# Patient Record
Sex: FEMALE | Race: WHITE
[De-identification: names, ages, dates, MRNs, and addresses within clinical notes are randomized per-mention and may not be internally consistent; named-entity substitution may affect disease eponyms.]

---

## 2019-06-16 ENCOUNTER — HOSPITAL ENCOUNTER (INPATIENT)
Dept: HOSPITAL 35 - ER | Age: 74
LOS: 1 days | Discharge: HOME | DRG: 189 | End: 2019-06-17
Attending: HOSPITALIST | Admitting: HOSPITALIST
Payer: COMMERCIAL

## 2019-06-16 VITALS — BODY MASS INDEX: 22.65 KG/M2 | HEIGHT: 57 IN | WEIGHT: 105 LBS

## 2019-06-16 VITALS — DIASTOLIC BLOOD PRESSURE: 83 MMHG | SYSTOLIC BLOOD PRESSURE: 193 MMHG

## 2019-06-16 DIAGNOSIS — I13.0: ICD-10-CM

## 2019-06-16 DIAGNOSIS — Z95.2: ICD-10-CM

## 2019-06-16 DIAGNOSIS — I16.0: ICD-10-CM

## 2019-06-16 DIAGNOSIS — Z82.49: ICD-10-CM

## 2019-06-16 DIAGNOSIS — Z95.0: ICD-10-CM

## 2019-06-16 DIAGNOSIS — I10: ICD-10-CM

## 2019-06-16 DIAGNOSIS — Z88.8: ICD-10-CM

## 2019-06-16 DIAGNOSIS — Z79.899: ICD-10-CM

## 2019-06-16 DIAGNOSIS — I50.9: ICD-10-CM

## 2019-06-16 DIAGNOSIS — J44.9: ICD-10-CM

## 2019-06-16 DIAGNOSIS — I49.5: ICD-10-CM

## 2019-06-16 DIAGNOSIS — J96.21: Primary | ICD-10-CM

## 2019-06-16 DIAGNOSIS — N17.9: ICD-10-CM

## 2019-06-16 DIAGNOSIS — Z88.0: ICD-10-CM

## 2019-06-16 DIAGNOSIS — Z99.81: ICD-10-CM

## 2019-06-16 DIAGNOSIS — Z79.82: ICD-10-CM

## 2019-06-16 DIAGNOSIS — N18.9: ICD-10-CM

## 2019-06-16 DIAGNOSIS — F41.9: ICD-10-CM

## 2019-06-16 DIAGNOSIS — Z90.89: ICD-10-CM

## 2019-06-16 DIAGNOSIS — D64.9: ICD-10-CM

## 2019-06-16 DIAGNOSIS — Z87.891: ICD-10-CM

## 2019-06-16 LAB
ANION GAP SERPL CALC-SCNC: 3 MMOL/L (ref 7–16)
BE(VIVO): 4.3 MMOL/L
BUN SERPL-MCNC: 21 MG/DL (ref 7–18)
CALCIUM SERPL-MCNC: 9 MG/DL (ref 8.5–10.1)
CHLORIDE SERPL-SCNC: 97 MMOL/L (ref 98–107)
CO2 SERPL-SCNC: 37 MMOL/L (ref 21–32)
CREAT SERPL-MCNC: 1.2 MG/DL (ref 0.6–1)
ERYTHROCYTE [DISTWIDTH] IN BLOOD BY AUTOMATED COUNT: 19.3 % (ref 10.5–14.5)
GLUCOSE SERPL-MCNC: 159 MG/DL (ref 74–106)
HCO3 BLD-SCNC: 29.1 MMOL/L (ref 22–26)
HCT VFR BLD CALC: 29.5 % (ref 37–47)
HGB BLD-MCNC: 9.2 GM/DL (ref 12–15)
MCH RBC QN AUTO: 22.6 PG (ref 26–34)
MCHC RBC AUTO-ENTMCNC: 31.2 G/DL (ref 28–37)
MCV RBC: 72.5 FL (ref 80–100)
PCO2 BLD: 44.7 MMHG (ref 35–45)
PLATELET # BLD: 391 THOU/UL (ref 150–400)
PO2 BLD: 246.5 MMHG (ref 80–100)
POTASSIUM SERPL-SCNC: 3.6 MMOL/L (ref 3.5–5.1)
RBC # BLD AUTO: 4.07 MIL/UL (ref 4.2–5)
SODIUM SERPL-SCNC: 137 MMOL/L (ref 136–145)
TROPONIN I SERPL-MCNC: 0.12 NG/ML (ref ?–0.06)
WBC # BLD AUTO: 13 THOU/UL (ref 4–11)

## 2019-06-16 PROCEDURE — 10081 I&D PILONIDAL CYST COMP: CPT

## 2019-06-17 VITALS — DIASTOLIC BLOOD PRESSURE: 67 MMHG | SYSTOLIC BLOOD PRESSURE: 136 MMHG

## 2019-06-17 VITALS — SYSTOLIC BLOOD PRESSURE: 156 MMHG | DIASTOLIC BLOOD PRESSURE: 62 MMHG

## 2019-06-17 VITALS — DIASTOLIC BLOOD PRESSURE: 56 MMHG | SYSTOLIC BLOOD PRESSURE: 152 MMHG

## 2019-06-17 LAB
ALBUMIN SERPL-MCNC: 2.8 G/DL (ref 3.4–5)
ALT SERPL-CCNC: 22 U/L (ref 30–65)
AST SERPL-CCNC: 57 U/L (ref 15–37)
BILIRUB DIRECT SERPL-MCNC: < 0.1 MG/DL
BILIRUB SERPL-MCNC: 0.4 MG/DL
PROT SERPL-MCNC: 8.1 G/DL (ref 6.4–8.2)

## 2019-06-17 NOTE — NUR
met with elisabeth who is planned dc today if can have her oxygen fixed prior to
leaving. Patient reports she has oxygen at home but cannot recall agency. She
reports her family is working on getting her oxygen fixed and CM is not
needed. Patient reports her grandson working on issue and she was agreeable to
cm calling jeannette. Grandson Xavier is not working on this issue but reports
his other grandmother is working on it and he doesnt know name of company but
CM can call [employer]. Left [employer]i a message. Patient requested to see CM and
reports her friend working on oxygen and she reports do not need to get
involved apt with company at 1530 today.  She gave me number for friend
Amelia [phone] who reports oxygen company is [employer]/ and do not
need to get involved apt already set. Updated RN.

## 2019-06-17 NOTE — NUR
PATIENTS CARES WERE ASSUMED AT APPRON 0300 FROM ER. PATIENT WAS ASSESSED
ANDADMITTED TO THE FLOOR. PATIENT STATES SHE ONLY HAS PAIN IN HER LEFT ANKLE
RATES 2/10. HOURLY ROUNDING WAS DONE. YELLOW SOCKES WERE GIVEN. BED IS IN LOW
AND LOCKED POSITION. THE BED ALARM IS ON. PAPER WORK IS SIGNED.